# Patient Record
Sex: FEMALE | Race: WHITE | NOT HISPANIC OR LATINO | Employment: OTHER | URBAN - METROPOLITAN AREA
[De-identification: names, ages, dates, MRNs, and addresses within clinical notes are randomized per-mention and may not be internally consistent; named-entity substitution may affect disease eponyms.]

---

## 2023-05-12 ENCOUNTER — HOSPITAL ENCOUNTER (OUTPATIENT)
Dept: DATA CONVERSION | Facility: HOSPITAL | Age: 78
End: 2023-05-12
Attending: INTERNAL MEDICINE | Admitting: INTERNAL MEDICINE
Payer: MEDICARE

## 2023-05-12 DIAGNOSIS — E78.5 HYPERLIPIDEMIA, UNSPECIFIED: ICD-10-CM

## 2023-05-12 DIAGNOSIS — K29.40 CHRONIC ATROPHIC GASTRITIS WITHOUT BLEEDING: ICD-10-CM

## 2023-05-12 DIAGNOSIS — F32.A DEPRESSION, UNSPECIFIED: ICD-10-CM

## 2023-05-12 DIAGNOSIS — F17.200 NICOTINE DEPENDENCE, UNSPECIFIED, UNCOMPLICATED: ICD-10-CM

## 2023-05-12 DIAGNOSIS — I20.9 ANGINA PECTORIS, UNSPECIFIED (CMS-HCC): ICD-10-CM

## 2023-05-12 DIAGNOSIS — R14.0 ABDOMINAL DISTENSION (GASEOUS): ICD-10-CM

## 2023-05-12 DIAGNOSIS — R11.0 NAUSEA: ICD-10-CM

## 2023-05-12 DIAGNOSIS — K51.90 ULCERATIVE COLITIS, UNSPECIFIED, WITHOUT COMPLICATIONS (MULTI): ICD-10-CM

## 2023-05-12 DIAGNOSIS — Z91.041 RADIOGRAPHIC DYE ALLERGY STATUS: ICD-10-CM

## 2023-05-12 DIAGNOSIS — I10 ESSENTIAL (PRIMARY) HYPERTENSION: ICD-10-CM

## 2023-05-12 DIAGNOSIS — Q39.9 CONGENITAL MALFORMATION OF ESOPHAGUS, UNSPECIFIED: ICD-10-CM

## 2023-05-12 DIAGNOSIS — K21.9 GASTRO-ESOPHAGEAL REFLUX DISEASE WITHOUT ESOPHAGITIS: ICD-10-CM

## 2023-05-12 DIAGNOSIS — R11.2 NAUSEA WITH VOMITING, UNSPECIFIED: ICD-10-CM

## 2023-05-12 DIAGNOSIS — K31.84 GASTROPARESIS: ICD-10-CM

## 2023-05-12 DIAGNOSIS — K50.90 CROHN'S DISEASE, UNSPECIFIED, WITHOUT COMPLICATIONS (MULTI): ICD-10-CM

## 2023-05-12 DIAGNOSIS — E53.8 DEFICIENCY OF OTHER SPECIFIED B GROUP VITAMINS: ICD-10-CM

## 2023-05-12 DIAGNOSIS — F41.9 ANXIETY DISORDER, UNSPECIFIED: ICD-10-CM

## 2023-05-12 DIAGNOSIS — Z88.0 ALLERGY STATUS TO PENICILLIN: ICD-10-CM

## 2023-05-12 DIAGNOSIS — Z85.3 PERSONAL HISTORY OF MALIGNANT NEOPLASM OF BREAST: ICD-10-CM

## 2023-05-19 LAB
COMPLETE PATHOLOGY REPORT: NORMAL
CONVERTED CLINICAL DIAGNOSIS-HISTORY: NORMAL
CONVERTED FINAL DIAGNOSIS: NORMAL
CONVERTED FINAL REPORT PDF LINK TO COPY AND PASTE: NORMAL
CONVERTED GROSS DESCRIPTION: NORMAL

## 2023-09-07 VITALS
BODY MASS INDEX: 27.4 KG/M2 | SYSTOLIC BLOOD PRESSURE: 173 MMHG | HEIGHT: 60 IN | HEART RATE: 61 BPM | WEIGHT: 139.55 LBS | DIASTOLIC BLOOD PRESSURE: 76 MMHG | RESPIRATION RATE: 16 BRPM | TEMPERATURE: 98.4 F

## 2023-09-14 NOTE — H&P
History of Present Illness:   History Present Illness:  Reason for surgery: Nausea, bloating   HPI:    77 year-old female with history left breast DCIS s/p lumpectomy, hypertension, hyperlipidemia, autoimmune atrophic gastritis, vitamin B12 deficiency, gastroparesis,  and possible Crohn?s disease who presented for diagnostic EGD to evaluate for nausea and vomiting.  She has recently taken reglan 5 mg twice daily which has helped her symptoms.    Allergies:        Allergies:  ·  contrast (specific type unknown) : Resp Distress, Other  ·  penicillin : Rash       Intolerances:  ·  Elavil : Anxiety    Home Medication Review:   Home Medications Reviewed: yes     Impression/Procedure:   ·  Impression and Planned Procedure: 77 year-old female with history left breast DCIS s/p lumpectomy, hypertension, hyperlipidemia, autoimmune atrophic gastritis, vitamin B12 deficiency, gastroparesis,  and possible Crohn?s disease who presented for diagnostic EGD to evaluate for nausea and vomiting.  She has recently taken reglan 5 mg twice daily which has helped her symptoms.       ERAS (Enhanced Recovery After Surgery):  ·  ERAS Patient: no     Review of Systems:   Review of Systems:  Gastrointestinal: POSITIVE: Nausea, Vomiting; NEGATIVE:  Diarrhea, Constipation, Abdominal Pain         Vital Signs:  Temperature C: 36.9 degrees C   Temperature F: 98.4 degrees F   Heart Rate: 61 beats per minute   Respiratory Rate: 16 breath per minute   Blood Pressure Systolic: 173 mm/Hg   Blood Pressure Diastolic: 76 mm/Hg     Physical Exam by System:    Constitutional: Awake/alert/oriented x3, no distress,  alert and cooperative   Eyes: EOMI, clear sclera   ENMT: mucous membranes moist, no apparent injury   Head/Neck: Neck supple, no apparent injury   Respiratory/Thorax: CTAB, normal breath sounds   Cardiovascular: Regular, rate and rhythm, no murmurs   Gastrointestinal: Nondistended, soft, non-tender,  no rebound tenderness or guarding    Musculoskeletal: ROM intact   Extremities: normal extremities, no cyanosis   Neurological: alert and oriented x3, intact senses   Psychological: Appropriate mood and behavior   Skin: Warm and dry, no rash     Consent:   COVID-19 Consent:  ·  COVID-19 Risk Consent Surgeon has reviewed key risks related to the risk of sydni COVID-19 and if they contract COVID-19 what the risks are.       Electronic Signatures:  Luis Parisi)  (Signed 12-May-2023 10:43)   Authored: History of Present Illness, Allergies, Home  Medication Review, Impression/Procedure, ERAS, Review of Systems, Physical Exam, Consent, Note Completion      Last Updated: 12-May-2023 10:43 by Luis Parisi)

## 2023-11-02 ENCOUNTER — APPOINTMENT (OUTPATIENT)
Dept: RADIOLOGY | Facility: HOSPITAL | Age: 78
End: 2023-11-02
Payer: MEDICARE

## 2023-12-08 ENCOUNTER — APPOINTMENT (OUTPATIENT)
Dept: GASTROENTEROLOGY | Facility: CLINIC | Age: 78
End: 2023-12-08
Payer: MEDICARE

## 2024-01-10 NOTE — PROGRESS NOTES
Subjective     History of Present Illness:   Erica Rey is a 78 y.o. female with history left breast DCIS s/p lumpectomy, hypertension, hyperlipidemia, autoimmune atrophic gastritis, vitamin B12 deficiency, gastroparesis, and possible Crohn's disease who presented to GI clinic for follow-up.  Patient was in an intensive outpatient psychotherapy program for 8 weeks recently which she found very helpful for her anxiety.  Patient's nausea, vomiting, and abdominal bloating are under controlled.  She has stopped all GI medications including reglan and continues to feel well.  She has also stopped THC gummies.  Patient is sleeping 7-8 hours at night so her insomnia has significantly improved as well.  Patient has gained 20 lbs in the past several months.  She is seeing psychiatry who manages her anti-anxiety medications.    IBD History:  Patient was diagnosed with Crohn's disease in 2013. Colonoscopy showed mild ileocolonic disease. Patient was not having significant GI symptoms at the time. Patient was started on budesonide. Patient tried adalimumab but developed joint pain so it was discontinued.     Patient was following Dr. Darron Little from 2013 to 2019. Her main symptoms were abdominal discomfort and constipation. EGD and colonoscopy in August 2019 with Dr. Little showed Schatzki's ring that was dilated with a 52 Fr Koch, gastropathy, ileitis, and sigmoid diverticulosis. Biopsies from stomach showed reactive gastritis with intestinal metaplasia, no H. pylori, increased eosinophils in the TI, and normal colon bx.      Patient then transitioned care to Dr. Almonte at [facility] in 2020. She was tapered off budesonide in 2021. She has not been on any medications for her Crohn's disease since 2021. Her EGD in January 2022 at [facility] showed atrophic gastritis with intestinal metaplasia in the greater curvature, no evidence of H. pylori. She had a colonoscopy in August 2022 at [facility] that showed a single ulcer and  mild stricture in the terminal ileum. Patent was placed on IBGuard in May 2022 and takes bentyl as needed. Her gastrin level was 1200s in August with positive IF Ab consistent with pernicious anemia.     Patient established care with our office in November 2022. She complained of chronic nausea, weight loss, and constipation then. She had tried different PPIs, phenergan, and zofran at [facility] but the nausea persisted. Gastric emptying study in 12/2020 showed 11-20% food retention at 4hr suggestive of mild gastroparesis. MRE on 12/7/22 showed questionable 2 cm segment of TI with mild mucosal hyperenhancement without wall thickening. Capsule endoscopy on 1/31/23 showed few scattered erosions in the jejunum and ileum, likely not clinically significant. She was placed on reglan and her GI symptoms improved.  Patient also takes THC gummy for her nausea.  She was admitted to [facility] for panic attack in September 2023. She was placed on Lexapro, Zyprexa at night, and hydroxyzine as needed.  She felt that these medications significantly helped her nausea      Review of Systems  Review of Systems   Constitutional:  Negative for chills and fever.   HENT:  Negative for mouth sores.    Eyes:  Negative for pain.   Respiratory:  Negative for shortness of breath.    Cardiovascular:  Negative for chest pain.   Gastrointestinal:         See HPI   Musculoskeletal:  Negative for arthralgias.   Skin:  Negative for rash.   Neurological:  Negative for weakness.       Past Medical History   has a past medical history of Bifascicular block, Generalized anxiety disorder, Other conditions influencing health status, Other specified noninflammatory disorders of vagina, Palpitations, Personal history of other benign neoplasm, Personal history of other diseases of the circulatory system, Personal history of other diseases of the circulatory system, Personal history of other diseases of the digestive system, Personal history of other diseases of the  digestive system, Personal history of other diseases of the digestive system, Personal history of other diseases of the nervous system and sense organs, Personal history of other endocrine, nutritional and metabolic disease, Personal history of other mental and behavioral disorders, Personal history of other specified conditions (01/04/2023), Personal history of other specified conditions, Personal history of other venous thrombosis and embolism, Radiographic dye allergy status (11/08/2022), and Urinary tract infection, site not specified.     Social History   reports that she has never smoked. She has never used smokeless tobacco. She reports that she does not currently use alcohol. She reports that she does not use drugs.     Family History  family history is not on file.     Allergies  Allergies   Allergen Reactions    Adalimumab Unknown    Amitriptyline Unknown    Iodinated Contrast Media Unknown     Contrast Dye    Polyethylene Glycol Unknown    Iodine GI Upset    Morphine Rash    Penicillin Rash       Medications  Current Outpatient Medications   Medication Instructions    cholecalciferol-soy isoflavone 2,000-64 unit-mg tablet oral    cyanocobalamin (VITAMIN B-12) 1,000 mcg, oral, Daily, Once a month injection    escitalopram (LEXAPRO) 10 mg, oral, Daily    hydrOXYzine HCL (Atarax) 25 mg tablet oral    lisinopril 20 mg, oral, 2 times daily    magnesium oxide (MAG-OX) 400 mg, Daily    melatonin 1 mg tablet,disintegrating oral    metoprolol succinate XL (TOPROL-XL) 50 mg, oral, Daily, Do not crush or chew.    montelukast (SINGULAIR) 10 mg, oral, Nightly    multivitamin-children's (Cerovite, Jr) chewable tablet 1 tablet, oral, Daily    OLANZapine zydis (ZYPREXA) 10 mg, oral, Nightly    ondansetron (ZOFRAN) 4 mg, oral, Every 8 hours PRN    pravastatin (PRAVACHOL) 40 mg, oral, Nightly        Objective   Visit Vitals  /74 (BP Location: Right arm, Patient Position: Sitting, BP Cuff Size: Adult)   Pulse 66  "  Temp 36.1 °C (97 °F)      Physical Exam  Constitutional:       Appearance: Normal appearance.   HENT:      Head: Normocephalic and atraumatic.   Eyes:      General: No scleral icterus.  Cardiovascular:      Rate and Rhythm: Normal rate and regular rhythm.   Pulmonary:      Effort: Pulmonary effort is normal.      Breath sounds: Normal breath sounds. No wheezing.   Abdominal:      General: Bowel sounds are normal.      Palpations: Abdomen is soft. There is no mass.      Tenderness: There is abdominal tenderness. There is no guarding or rebound.      Comments: Mild diffuse tenderness to palpation   Musculoskeletal:         General: Normal range of motion.      Cervical back: Normal range of motion.   Skin:     Coloration: Skin is not jaundiced.   Neurological:      Mental Status: She is alert and oriented to person, place, and time.         Labs  Lab Results   Component Value Date    WBC 5.5 08/27/2023    HGB 13.4 08/27/2023    HCT 40.9 08/27/2023    MCV 89 08/27/2023     08/27/2023     Lab Results   Component Value Date    GLUCOSE 118 (H) 08/27/2023    CALCIUM 9.0 08/27/2023     08/27/2023    K 4.2 08/27/2023    CO2 28 08/27/2023     08/27/2023    BUN 15 08/27/2023    CREATININE 0.51 08/27/2023     Lab Results   Component Value Date    ALT 15 08/27/2023    AST 25 08/27/2023    ALKPHOS 45 08/27/2023    BILITOT 0.6 08/27/2023     Lab Results   Component Value Date    INR 1.1 08/27/2023     Lab Results   Component Value Date    CRP <0.10 11/02/2022     No results found for: \"CALPS\"    Assessment/Plan   Erica Rey is a 78 y.o. female with history left breast DCIS s/p lumpectomy, hypertension, hyperlipidemia, autoimmune atrophic gastritis, vitamin B12 deficiency, gastroparesis, and possible Crohn's disease who presented to GI clinic for follow-up.  Patient had recurrent chronic nausea and abdominal pain of  years that were likely due to functional abdominal pain related to uncontrolled anxiety.  " Since starting on anti-anxiety medications and receiving psychotherapy, her GI symptoms have significantly improved.  She has stopped all anti-emetics and tolerating diet without difficulty.      She possibly has very mild Crohn's ileitis but recent capsule endoscopy did not show significant inflammation in the GI tract.  Given the lack of symptoms and questionable diagnosis, we will defer any therapy and continue monitor at this time.    Patient Instructions  Continue current psychotherapy and anxiolytics.  Monitor calorie intake.  Follow-up in 6 months.    Luis Parisi MD

## 2024-01-12 ENCOUNTER — OFFICE VISIT (OUTPATIENT)
Dept: GASTROENTEROLOGY | Facility: CLINIC | Age: 79
End: 2024-01-12
Payer: MEDICARE

## 2024-01-12 VITALS
HEIGHT: 64 IN | BODY MASS INDEX: 25.61 KG/M2 | TEMPERATURE: 97 F | WEIGHT: 150 LBS | DIASTOLIC BLOOD PRESSURE: 74 MMHG | HEART RATE: 66 BPM | SYSTOLIC BLOOD PRESSURE: 119 MMHG

## 2024-01-12 DIAGNOSIS — R10.9 CHRONIC ABDOMINAL PAIN: ICD-10-CM

## 2024-01-12 DIAGNOSIS — R10.9 FUNCTIONAL ABDOMINAL PAIN SYNDROME: Primary | ICD-10-CM

## 2024-01-12 DIAGNOSIS — G89.29 CHRONIC ABDOMINAL PAIN: ICD-10-CM

## 2024-01-12 PROCEDURE — 1036F TOBACCO NON-USER: CPT | Performed by: INTERNAL MEDICINE

## 2024-01-12 PROCEDURE — 1159F MED LIST DOCD IN RCRD: CPT | Performed by: INTERNAL MEDICINE

## 2024-01-12 PROCEDURE — 99213 OFFICE O/P EST LOW 20 MIN: CPT | Performed by: INTERNAL MEDICINE

## 2024-01-12 PROCEDURE — 1125F AMNT PAIN NOTED PAIN PRSNT: CPT | Performed by: INTERNAL MEDICINE

## 2024-01-12 RX ORDER — CALCIUM CARBONATE 300MG(750)
400 TABLET,CHEWABLE ORAL DAILY
COMMUNITY

## 2024-01-12 RX ORDER — MONTELUKAST SODIUM 10 MG/1
10 TABLET ORAL NIGHTLY
COMMUNITY

## 2024-01-12 RX ORDER — PRAVASTATIN SODIUM 40 MG/1
40 TABLET ORAL NIGHTLY
COMMUNITY

## 2024-01-12 RX ORDER — BIOTIN 5000 MCG
TABLET,DISINTEGRATING ORAL
COMMUNITY

## 2024-01-12 RX ORDER — LISINOPRIL 20 MG/1
20 TABLET ORAL 2 TIMES DAILY
COMMUNITY

## 2024-01-12 RX ORDER — HYDROXYZINE HYDROCHLORIDE 25 MG/1
TABLET, FILM COATED ORAL
COMMUNITY

## 2024-01-12 RX ORDER — METOPROLOL SUCCINATE 50 MG/1
50 TABLET, EXTENDED RELEASE ORAL DAILY
COMMUNITY

## 2024-01-12 RX ORDER — ONDANSETRON 4 MG/1
4 TABLET, FILM COATED ORAL EVERY 8 HOURS PRN
COMMUNITY

## 2024-01-12 RX ORDER — LANOLIN ALCOHOL/MO/W.PET/CERES
1000 CREAM (GRAM) TOPICAL DAILY
COMMUNITY

## 2024-01-12 RX ORDER — ESCITALOPRAM OXALATE 10 MG/1
10 TABLET ORAL DAILY
COMMUNITY

## 2024-01-12 RX ORDER — OLANZAPINE 10 MG/1
10 TABLET, ORALLY DISINTEGRATING ORAL NIGHTLY
COMMUNITY

## 2024-01-12 ASSESSMENT — ENCOUNTER SYMPTOMS
ARTHRALGIAS: 0
SHORTNESS OF BREATH: 0
WEAKNESS: 0
CHILLS: 0
EYE PAIN: 0
FEVER: 0
ROS GI COMMENTS: SEE HPI

## 2024-06-12 ENCOUNTER — APPOINTMENT (OUTPATIENT)
Dept: GASTROENTEROLOGY | Facility: CLINIC | Age: 79
End: 2024-06-12
Payer: MEDICARE

## 2024-07-17 ENCOUNTER — OFFICE VISIT (OUTPATIENT)
Dept: GASTROENTEROLOGY | Facility: CLINIC | Age: 79
End: 2024-07-17
Payer: MEDICARE

## 2024-07-17 VITALS
WEIGHT: 160 LBS | DIASTOLIC BLOOD PRESSURE: 67 MMHG | HEART RATE: 69 BPM | BODY MASS INDEX: 27.31 KG/M2 | SYSTOLIC BLOOD PRESSURE: 98 MMHG | TEMPERATURE: 97.6 F | HEIGHT: 64 IN

## 2024-07-17 DIAGNOSIS — R10.9 ABDOMINAL DISCOMFORT: ICD-10-CM

## 2024-07-17 DIAGNOSIS — R11.0 CHRONIC NAUSEA: Primary | ICD-10-CM

## 2024-07-17 PROCEDURE — 1159F MED LIST DOCD IN RCRD: CPT | Performed by: INTERNAL MEDICINE

## 2024-07-17 PROCEDURE — 99213 OFFICE O/P EST LOW 20 MIN: CPT | Performed by: INTERNAL MEDICINE

## 2024-07-17 PROCEDURE — 1036F TOBACCO NON-USER: CPT | Performed by: INTERNAL MEDICINE

## 2024-07-17 PROCEDURE — 1157F ADVNC CARE PLAN IN RCRD: CPT | Performed by: INTERNAL MEDICINE

## 2024-07-17 RX ORDER — ONDANSETRON 4 MG/1
4 TABLET, FILM COATED ORAL EVERY 8 HOURS PRN
Qty: 60 TABLET | Refills: 1 | Status: SHIPPED | OUTPATIENT
Start: 2024-07-17

## 2024-07-17 RX ORDER — HYDROXYZINE PAMOATE 25 MG/1
25 CAPSULE ORAL 3 TIMES DAILY PRN
COMMUNITY
End: 2024-07-17 | Stop reason: SDUPTHER

## 2024-07-17 ASSESSMENT — ENCOUNTER SYMPTOMS
FEVER: 0
UNEXPECTED WEIGHT CHANGE: 0
EYE PAIN: 0
ROS GI COMMENTS: SEE HPI
CHILLS: 0
SHORTNESS OF BREATH: 0
WEAKNESS: 0
ARTHRALGIAS: 0

## 2024-07-17 NOTE — PATIENT INSTRUCTIONS
Prescribe zofran to take as needed up to three times a day for nausea.  Continue with psychotherapy session.  Follow-up in 3 months.   Scribe Attestation (For Scribes USE Only)... Attending Attestation (For Attendings USE Only).../Scribe Attestation (For Scribes USE Only)...

## 2024-07-17 NOTE — PROGRESS NOTES
Subjective     History of Present Illness:   Erica Rey is a 79 y.o. female with history left breast DCIS s/p lumpectomy, hypertension, hyperlipidemia, autoimmune atrophic gastritis, vitamin B12 deficiency, gastroparesis, and possible Crohn's disease who presented to GI clinic for follow-up.  Patient was doing very well until about 3 weeks ago, she reports having return of nausea.  This correlates to a time of stress in the family since her son was going through a divorce and her grandson was acting out.  She feels nauseous the entire day and generalized ache.  Patient denies having vomiting.or change in bowel habits.  She takes zofran which seems to help.  She recently took an antibiotic for her UTI about a week ago.  She has gained 20 lbs since early this year but lost about 10 lbs in the past 3-4 weeks.  Patient has joined [facility] outpatient psychotherapy (IOP) recently to help with stress reduction management.    IBD History:  Patient was diagnosed with Crohn's disease in 2013. Colonoscopy showed mild ileocolonic disease. Patient was not having significant GI symptoms at the time. Patient was started on budesonide. Patient tried adalimumab but developed joint pain so it was discontinued.     Patient was following Dr. Darron Little from 2013 to 2019. Her main symptoms were abdominal discomfort and constipation. EGD and colonoscopy in August 2019 with Dr. Little showed Schatzki's ring that was dilated with a 52 Fr Koch, gastropathy, ileitis, and sigmoid diverticulosis. Biopsies from stomach showed reactive gastritis with intestinal metaplasia, no H. pylori, increased eosinophils in the TI, and normal colon bx.      Patient then transitioned care to Dr. Almonte at [facility] in 2020. She was tapered off budesonide in 2021. She has not been on any medications for her Crohn's disease since 2021. Her EGD in January 2022 at [facility] showed atrophic gastritis with intestinal metaplasia in the greater  curvature, no evidence of H. pylori. She had a colonoscopy in August 2022 at [facility] that showed a single ulcer and mild stricture in the terminal ileum. Patent was placed on IBGuard in May 2022 and takes bentyl as needed. Her gastrin level was 1200s in August with positive IF Ab consistent with pernicious anemia.     Patient established care with our office in November 2022. She complained of chronic nausea, weight loss, and constipation then. She had tried different PPIs, phenergan, and zofran at [facility] but the nausea persisted. Gastric emptying study in 12/2020 showed 11-20% food retention at 4hr suggestive of mild gastroparesis. MRE on 12/7/22 showed questionable 2 cm segment of TI with mild mucosal hyperenhancement without wall thickening. Capsule endoscopy on 1/31/23 showed few scattered erosions in the jejunum and ileum, likely not clinically significant. She was placed on reglan and her GI symptoms improved.  Patient also takes THC gummy for her nausea.  She was admitted to [facility] for panic attack in September 2023. She was placed on Lexapro, Zyprexa at night, and hydroxyzine as needed.  She felt that these medications significantly helped her nausea.  She underwent intensive outpatient psychotherapy program for 8 weeks in December 2023 and was able to stop all of her GI medications after this therapy.      Review of Systems  Review of Systems   Constitutional:  Negative for chills, fever and unexpected weight change.   HENT:  Negative for mouth sores.    Eyes:  Negative for pain.   Respiratory:  Negative for shortness of breath.    Cardiovascular:  Negative for chest pain.   Gastrointestinal:         See HPI   Musculoskeletal:  Negative for arthralgias.   Skin:  Negative for rash.   Neurological:  Negative for weakness.       Past Medical History   has a past medical history of Bifascicular block, Generalized anxiety disorder, Other conditions influencing health status, Other specified noninflammatory disorders of  vagina, Palpitations, Personal history of other benign neoplasm, Personal history of other diseases of the circulatory system, Personal history of other diseases of the circulatory system, Personal history of other diseases of the digestive system, Personal history of other diseases of the digestive system, Personal history of other diseases of the digestive system, Personal history of other diseases of the nervous system and sense organs, Personal history of other endocrine, nutritional and metabolic disease, Personal history of other mental and behavioral disorders, Personal history of other specified conditions (01/04/2023), Personal history of other specified conditions, Personal history of other venous thrombosis and embolism, Radiographic dye allergy status (11/08/2022), and Urinary tract infection, site not specified.     Social History   reports that she has never smoked. She has never used smokeless tobacco. She reports that she does not currently use alcohol. She reports that she does not use drugs.     Family History  family history is not on file.     Allergies  Allergies   Allergen Reactions    Adalimumab Unknown    Amitriptyline Unknown    Iodinated Contrast Media Unknown     Contrast Dye    Polyethylene Glycol Unknown    Iodine GI Upset    Morphine Rash    Penicillin Rash       Medications  Current Outpatient Medications   Medication Instructions    cholecalciferol-soy isoflavone 2,000-64 unit-mg tablet oral    cyanocobalamin (VITAMIN B-12) 1,000 mcg, oral, Daily, Once a month injection    escitalopram (LEXAPRO) 10 mg, oral, Daily    hydrOXYzine HCL (Atarax) 25 mg tablet oral    lisinopril 20 mg, oral, 2 times daily    magnesium oxide (MAG-OX) 400 mg, Daily    melatonin 1 mg tablet,disintegrating oral    metoprolol succinate XL (TOPROL-XL) 50 mg, oral, Daily, Do not crush or chew.    montelukast (SINGULAIR) 10 mg, oral, Nightly    multivitamin-children's (Cerovite, Jr) chewable tablet 1 tablet, oral,  Daily    OLANZapine zydis (ZYPREXA) 10 mg, oral, Nightly    ondansetron (ZOFRAN) 4 mg, oral, Every 8 hours PRN    pravastatin (PRAVACHOL) 40 mg, oral, Nightly        Objective   Visit Vitals  BP 98/67 (BP Location: Left arm, Patient Position: Sitting, BP Cuff Size: Adult)   Pulse 69   Temp 36.4 °C (97.6 °F)      Vitals:    07/17/24 1300   Weight: 72.6 kg (160 lb)     Body mass index is 27.9 kg/m².  Physical Exam  Constitutional:       Appearance: Normal appearance.   HENT:      Head: Normocephalic and atraumatic.   Eyes:      General: No scleral icterus.  Cardiovascular:      Rate and Rhythm: Normal rate and regular rhythm.   Pulmonary:      Effort: Pulmonary effort is normal.      Breath sounds: Normal breath sounds. No wheezing.   Abdominal:      General: Bowel sounds are normal.      Palpations: Abdomen is soft. There is no mass.      Tenderness: There is abdominal tenderness. There is no guarding or rebound.      Comments: Mild generalized abdominal discomfort to palpation    Musculoskeletal:         General: Normal range of motion.      Cervical back: Normal range of motion.   Skin:     Coloration: Skin is not jaundiced.   Neurological:      Mental Status: She is alert and oriented to person, place, and time.         Labs  Lab Results   Component Value Date    WBC 5.5 08/27/2023    HGB 13.4 08/27/2023    HCT 40.9 08/27/2023    MCV 89 08/27/2023     08/27/2023     Lab Results   Component Value Date    GLUCOSE 118 (H) 08/27/2023    CALCIUM 9.0 08/27/2023     08/27/2023    K 4.2 08/27/2023    CO2 28 08/27/2023     08/27/2023    BUN 15 08/27/2023    CREATININE 0.51 08/27/2023     Lab Results   Component Value Date    ALT 15 08/27/2023    AST 25 08/27/2023    ALKPHOS 45 08/27/2023    BILITOT 0.6 08/27/2023     Lab Results   Component Value Date    INR 1.1 08/27/2023     Lab Results   Component Value Date    CRP <0.10 11/02/2022       Assessment   Erica LAURENCE Krissy is a 79 y.o. female with history  left breast DCIS s/p lumpectomy, hypertension, hyperlipidemia, autoimmune atrophic gastritis, vitamin B12 deficiency, gastroparesis, and possible Crohn's disease who presented to GI clinic for follow-up.  Patient had recurrent chronic nausea and abdominal pain for years that were likely due to functional abdominal pain/dyspepsia.  Her symptoms are highly correlated with time of stress/anxiety and improves significantly with psychotherapy.  She has has been doing well since early this year but has recurrence of nausea about 3 weeks ago that correlated with time of stress in the family.  She recently enrolled in IOP (intensive outpatient psychotherapy) that has helped her symptoms previously.    Plan  Prescribe zofran to take as needed up to three times a day for nausea.  Continue with psychotherapy session.  Follow-up in 3 months.    Luis Parisi MD

## 2025-05-14 ENCOUNTER — OFFICE VISIT (OUTPATIENT)
Dept: GASTROENTEROLOGY | Facility: CLINIC | Age: 80
End: 2025-05-14
Payer: MEDICARE

## 2025-05-14 VITALS
TEMPERATURE: 97.3 F | BODY MASS INDEX: 30.52 KG/M2 | RESPIRATION RATE: 16 BRPM | HEIGHT: 64 IN | DIASTOLIC BLOOD PRESSURE: 63 MMHG | SYSTOLIC BLOOD PRESSURE: 89 MMHG | WEIGHT: 178.8 LBS | HEART RATE: 85 BPM

## 2025-05-14 DIAGNOSIS — F41.9 ANXIETY: ICD-10-CM

## 2025-05-14 DIAGNOSIS — R10.13 DYSPEPSIA: Primary | ICD-10-CM

## 2025-05-14 PROCEDURE — 1159F MED LIST DOCD IN RCRD: CPT | Performed by: INTERNAL MEDICINE

## 2025-05-14 PROCEDURE — 99213 OFFICE O/P EST LOW 20 MIN: CPT | Performed by: INTERNAL MEDICINE

## 2025-05-14 PROCEDURE — 1125F AMNT PAIN NOTED PAIN PRSNT: CPT | Performed by: INTERNAL MEDICINE

## 2025-05-14 PROCEDURE — 1157F ADVNC CARE PLAN IN RCRD: CPT | Performed by: INTERNAL MEDICINE

## 2025-05-14 ASSESSMENT — ENCOUNTER SYMPTOMS
CHILLS: 0
EYE PAIN: 0
UNEXPECTED WEIGHT CHANGE: 1
ROS GI COMMENTS: SEE HPI
SHORTNESS OF BREATH: 0
WEAKNESS: 0
ARTHRALGIAS: 0
FEVER: 0

## 2025-05-14 ASSESSMENT — PAIN SCALES - GENERAL: PAINLEVEL_OUTOF10: 2

## 2025-05-14 NOTE — PATIENT INSTRUCTIONS
Continue with conservative management with pepto-bismol as needed.  Recommend establishing with behavioral health counselor for intermittent psychotherapy for anxiety.  Refer to psychology.  Follow-up in 6 months.

## 2025-05-14 NOTE — PROGRESS NOTES
Subjective     History of Present Illness:   Erica Rey is a 79 y.o. female with history left breast DCIS s/p lumpectomy, hypertension, hyperlipidemia, autoimmune atrophic gastritis, vitamin B12 deficiency, gastroparesis, and possible Crohn's disease who presented to GI clinic for follow-up.      Since her last visit, patient has been doing well until a week ago when she begins to experience nausea and abdominal cramps.  She thinks it's from anxiety as she has had increased stress in her family recently.  Her nausea and abdominal cramps have been improving lately.  She takes pepto-bismol which helps her nausea.  She also has been having terrible nightmares.  Patient has gained 40-50 lbs in the past year.  She takes Lexapro for anxiety.      IBD History:  Patient was diagnosed with Crohn's disease in 2013. Colonoscopy showed mild ileocolonic disease. Patient was not having significant GI symptoms at the time. Patient was started on budesonide. Patient tried adalimumab but developed joint pain so it was discontinued.     Patient was following Dr. Darron Little from 2013 to 2019. Her main symptoms were abdominal discomfort and constipation. EGD and colonoscopy in August 2019 with Dr. Little showed Schatzki's ring that was dilated with a 52 Fr Koch, gastropathy, ileitis, and sigmoid diverticulosis. Biopsies from stomach showed reactive gastritis with intestinal metaplasia, no H. pylori, increased eosinophils in the TI, and normal colon bx.      Patient then transitioned care to Dr. Almonte at [facility] in 2020. She was tapered off budesonide in 2021. She has not been on any medications for her Crohn's disease since 2021. Her EGD in January 2022 at [facility] showed atrophic gastritis with intestinal metaplasia in the greater curvature, no evidence of H. pylori. She had a colonoscopy in August 2022 at [facility] that showed a single ulcer and mild stricture in the terminal ileum. Patent was placed on IBGuard in May 2022  and takes bentyl as needed. Her gastrin level was 1200s in August with positive IF Ab consistent with pernicious anemia.     Patient established care with our office in November 2022. She complained of chronic nausea, weight loss, and constipation then. She had tried different PPIs, phenergan, and zofran at [facility] but the nausea persisted. Gastric emptying study in 12/2020 showed 11-20% food retention at 4hr suggestive of mild gastroparesis. MRE on 12/7/22 showed questionable 2 cm segment of TI with mild mucosal hyperenhancement without wall thickening. Capsule endoscopy on 1/31/23 showed few scattered erosions in the jejunum and ileum, likely not clinically significant. She was placed on reglan and her GI symptoms only improved partially.  She was admitted to [facility] for panic attack in September 2023. She was placed on Lexapro, Zyprexa at night, and hydroxyzine as needed.  She felt that these medications significantly helped her nausea.  She underwent intensive outpatient psychotherapy program for 8 weeks in December 2023 and was able to stop all of her GI medications after this therapy.      Review of Systems  Review of Systems   Constitutional:  Positive for unexpected weight change. Negative for chills and fever.        Weight gain   HENT:  Negative for mouth sores.    Eyes:  Negative for pain.   Respiratory:  Negative for shortness of breath.    Cardiovascular:  Negative for chest pain.   Gastrointestinal:         See HPI   Musculoskeletal:  Negative for arthralgias.   Skin:  Negative for rash.   Neurological:  Negative for weakness.       Past Medical History   has a past medical history of Bifascicular block, Generalized anxiety disorder, Other conditions influencing health status, Other specified noninflammatory disorders of vagina, Palpitations, Personal history of other benign neoplasm, Personal history of other diseases of the circulatory system, Personal history of other diseases of the circulatory system,  Personal history of other diseases of the digestive system, Personal history of other diseases of the digestive system, Personal history of other diseases of the digestive system, Personal history of other diseases of the nervous system and sense organs, Personal history of other endocrine, nutritional and metabolic disease, Personal history of other mental and behavioral disorders, Personal history of other specified conditions (01/04/2023), Personal history of other specified conditions, Personal history of other venous thrombosis and embolism, Radiographic dye allergy status (11/08/2022), and Urinary tract infection, site not specified.     Social History   reports that she has never smoked. She has never used smokeless tobacco. She reports that she does not currently use alcohol. She reports that she does not use drugs.     Family History  family history is not on file.     Allergies  Allergies   Allergen Reactions    Adalimumab Unknown    Amitriptyline Unknown    Iodinated Contrast Media Unknown     Contrast Dye    Polyethylene Glycol Unknown    Iodine GI Upset    Morphine Rash    Penicillin Rash       Medications  Current Outpatient Medications   Medication Instructions    cholecalciferol-soy isoflavone 2,000-64 unit-mg tablet Take by mouth.    cyanocobalamin (VITAMIN B-12) 1,000 mcg, Daily    escitalopram (LEXAPRO) 10 mg, Daily    lisinopril 20 mg, 2 times daily    magnesium oxide (MAG-OX) 400 mg, Daily    melatonin 1 mg tablet,disintegrating Take by mouth.    metoprolol succinate XL (TOPROL-XL) 50 mg, Daily    montelukast (SINGULAIR) 10 mg, Nightly    multivitamin-children's (Cerovite, Jr) chewable tablet 1 tablet, Daily    OLANZapine zydis (ZYPREXA) 10 mg, Nightly    ondansetron (ZOFRAN) 4 mg, oral, Every 8 hours PRN    pravastatin (PRAVACHOL) 40 mg, Nightly        Objective   Visit Vitals  BP 89/63   Pulse 85   Temp 36.3 °C (97.3 °F)   Resp 16      Vitals:    05/14/25 1528   Weight: 81.1 kg (178 lb 12.8  oz)     Body mass index is 31.18 kg/m².  Physical Exam  Constitutional:       General: She is not in acute distress.     Appearance: Normal appearance.   HENT:      Head: Normocephalic and atraumatic.   Eyes:      Extraocular Movements: Extraocular movements intact.   Cardiovascular:      Rate and Rhythm: Normal rate and regular rhythm.      Heart sounds: Normal heart sounds. No murmur heard.  Pulmonary:      Effort: Pulmonary effort is normal.      Breath sounds: Normal breath sounds.   Abdominal:      General: Bowel sounds are normal. There is no distension.      Palpations: Abdomen is soft.      Tenderness: There is no abdominal tenderness. There is no guarding or rebound.   Musculoskeletal:         General: No swelling or tenderness. Normal range of motion.      Cervical back: Normal range of motion and neck supple.   Skin:     General: Skin is warm.      Coloration: Skin is not jaundiced.   Neurological:      General: No focal deficit present.      Mental Status: She is alert and oriented to person, place, and time.   Psychiatric:         Mood and Affect: Mood normal.         Behavior: Behavior normal.         Labs  Lab Results   Component Value Date    WBC 5.5 08/27/2023    HGB 13.4 08/27/2023    HCT 40.9 08/27/2023    MCV 89 08/27/2023     08/27/2023     Lab Results   Component Value Date    GLUCOSE 118 (H) 08/27/2023    CALCIUM 9.0 08/27/2023     08/27/2023    K 4.2 08/27/2023    CO2 28 08/27/2023     08/27/2023    BUN 15 08/27/2023    CREATININE 0.51 08/27/2023     Lab Results   Component Value Date    ALT 15 08/27/2023    AST 25 08/27/2023    ALKPHOS 45 08/27/2023    BILITOT 0.6 08/27/2023     Lab Results   Component Value Date    INR 1.1 08/27/2023     Lab Results   Component Value Date    CRP <0.10 11/02/2022       Assessment   Erica Rey is a 79 y.o. female with history left breast DCIS s/p lumpectomy, hypertension, hyperlipidemia, autoimmune atrophic gastritis, vitamin B12  deficiency, gastroparesis, and possible Crohn's disease who presented to GI clinic for follow-up.  Patient had recurrent chronic nausea and abdominal pain for years that were likely due to functional abdominal pain/dyspepsia.  Her symptoms are highly correlated with time of stress/anxiety and improves significantly with psychotherapy and anxiolytics.  She has mild recurrence of abdominal cramp and nausea in the past week which is already getting better on its own.        Plan  Continue with conservative management with pepto-bismol as needed.  Recommend establishing with behavioral health for intermittent psychotherapy for anxiety.  Refer to psychology.  Follow-up in 6 months.    Luis Parisi MD